# Patient Record
Sex: FEMALE | Race: WHITE
[De-identification: names, ages, dates, MRNs, and addresses within clinical notes are randomized per-mention and may not be internally consistent; named-entity substitution may affect disease eponyms.]

---

## 2017-04-01 ENCOUNTER — HOSPITAL ENCOUNTER (EMERGENCY)
Dept: HOSPITAL 1 - ED | Age: 77
LOS: 1 days | Discharge: HOME | End: 2017-04-02
Payer: COMMERCIAL

## 2017-04-01 DIAGNOSIS — E03.9: ICD-10-CM

## 2017-04-01 DIAGNOSIS — N18.3: ICD-10-CM

## 2017-04-01 DIAGNOSIS — E78.00: ICD-10-CM

## 2017-04-01 DIAGNOSIS — J44.9: ICD-10-CM

## 2017-04-01 DIAGNOSIS — I12.9: ICD-10-CM

## 2017-04-01 DIAGNOSIS — G25.5: ICD-10-CM

## 2017-04-01 DIAGNOSIS — N39.0: Primary | ICD-10-CM

## 2017-04-01 DIAGNOSIS — K57.32: ICD-10-CM

## 2017-04-01 LAB
ALBUMIN SERPL-MCNC: 3.6 G/DL (ref 3.4–5)
ALP SERPL-CCNC: 54 U/L (ref 46–116)
ALT SERPL-CCNC: 15 U/L (ref 14–59)
AMYLASE SERPL-CCNC: 36 U/L (ref 25–115)
AST SERPL-CCNC: 10 U/L (ref 15–37)
BASOPHILS NFR BLD: 0.5 % (ref 0–2)
BILIRUB SERPL-MCNC: 0.3 MG/DL (ref 0.2–1)
BUN SERPL-MCNC: 29 MG/DL (ref 7–18)
CALCIUM SERPL-MCNC: 9.4 MG/DL (ref 8.5–10.1)
CHLORIDE SERPL-SCNC: 103 MMOL/L (ref 98–107)
CO2 SERPL-SCNC: 29.1 MMOL/L (ref 21–32)
CREAT SERPL-MCNC: 1.2 MG/DL (ref 0.6–1)
ERYTHROCYTE [DISTWIDTH] IN BLOOD BY AUTOMATED COUNT: 16.6 % (ref 11.5–14.5)
GFR SERPLBLD BASED ON 1.73 SQ M-ARVRAT: (no result) ML/MIN
GLUCOSE SERPL-MCNC: 106 MG/DL (ref 74–106)
LIPASE SERPL-CCNC: 122 IU/L (ref 73–393)
MICROSCOPIC UR-IMP: YES
PLATELET # BLD: 297 X10^3MCL (ref 130–400)
POTASSIUM SERPL-SCNC: 4.8 MMOL/L (ref 3.5–5.1)
PROT SERPL-MCNC: 7.2 G/DL (ref 6.4–8.2)
RBC # UR STRIP.AUTO: (no result) /UL
SODIUM SERPL-SCNC: 139 MMOL/L (ref 136–145)
UA SPECIFIC GRAVITY: 1.02 (ref 1–1.03)

## 2017-04-02 VITALS — SYSTOLIC BLOOD PRESSURE: 137 MMHG | DIASTOLIC BLOOD PRESSURE: 80 MMHG

## 2017-04-06 ENCOUNTER — HOSPITAL ENCOUNTER (INPATIENT)
Dept: HOSPITAL 1 - ED | Age: 77
LOS: 5 days | Discharge: HOME HEALTH | DRG: 689 | End: 2017-04-11
Attending: FAMILY MEDICINE | Admitting: FAMILY MEDICINE
Payer: COMMERCIAL

## 2017-04-06 VITALS
BODY MASS INDEX: 28.88 KG/M2 | BODY MASS INDEX: 28.88 KG/M2 | HEIGHT: 69 IN | WEIGHT: 195 LBS | HEIGHT: 69 IN | WEIGHT: 195 LBS

## 2017-04-06 DIAGNOSIS — K21.9: ICD-10-CM

## 2017-04-06 DIAGNOSIS — F03.90: ICD-10-CM

## 2017-04-06 DIAGNOSIS — Z79.84: ICD-10-CM

## 2017-04-06 DIAGNOSIS — I12.9: ICD-10-CM

## 2017-04-06 DIAGNOSIS — G47.33: ICD-10-CM

## 2017-04-06 DIAGNOSIS — E83.51: ICD-10-CM

## 2017-04-06 DIAGNOSIS — B96.20: ICD-10-CM

## 2017-04-06 DIAGNOSIS — Z85.3: ICD-10-CM

## 2017-04-06 DIAGNOSIS — E43: ICD-10-CM

## 2017-04-06 DIAGNOSIS — R80.8: ICD-10-CM

## 2017-04-06 DIAGNOSIS — N18.3: ICD-10-CM

## 2017-04-06 DIAGNOSIS — Z92.21: ICD-10-CM

## 2017-04-06 DIAGNOSIS — E83.42: ICD-10-CM

## 2017-04-06 DIAGNOSIS — E11.65: ICD-10-CM

## 2017-04-06 DIAGNOSIS — D68.69: ICD-10-CM

## 2017-04-06 DIAGNOSIS — F41.9: ICD-10-CM

## 2017-04-06 DIAGNOSIS — K52.9: ICD-10-CM

## 2017-04-06 DIAGNOSIS — J44.9: ICD-10-CM

## 2017-04-06 DIAGNOSIS — G25.2: ICD-10-CM

## 2017-04-06 DIAGNOSIS — N17.0: ICD-10-CM

## 2017-04-06 DIAGNOSIS — F32.9: ICD-10-CM

## 2017-04-06 DIAGNOSIS — R31.9: ICD-10-CM

## 2017-04-06 DIAGNOSIS — N39.0: Primary | ICD-10-CM

## 2017-04-06 DIAGNOSIS — E78.5: ICD-10-CM

## 2017-04-06 DIAGNOSIS — Z92.3: ICD-10-CM

## 2017-04-06 LAB
ALBUMIN SERPL-MCNC: 3.4 G/DL (ref 3.4–5)
ALP SERPL-CCNC: 51 U/L (ref 46–116)
ALT SERPL-CCNC: 14 U/L (ref 14–59)
AMYLASE SERPL-CCNC: 26 U/L (ref 25–115)
AST SERPL-CCNC: 25 U/L (ref 15–37)
BASOPHILS NFR BLD: 0.6 % (ref 0–2)
BILIRUB SERPL-MCNC: 0.43 MG/DL (ref 0.2–1)
BUN SERPL-MCNC: 18 MG/DL (ref 7–18)
CALCIUM SERPL-MCNC: 8.9 MG/DL (ref 8.5–10.1)
CHLORIDE SERPL-SCNC: 102 MMOL/L (ref 98–107)
CHOLEST SERPL-MCNC: 152 MG/DL (ref ?–200)
CHOLEST/HDLC SERPL: 2.3 MG/DL
CO2 SERPL-SCNC: 30.1 MMOL/L (ref 21–32)
CREAT SERPL-MCNC: 1.2 MG/DL (ref 0.6–1)
ERYTHROCYTE [DISTWIDTH] IN BLOOD BY AUTOMATED COUNT: 16.2 % (ref 11.5–14.5)
GFR SERPLBLD BASED ON 1.73 SQ M-ARVRAT: (no result) ML/MIN
GLUCOSE SERPL-MCNC: 95 MG/DL (ref 74–106)
HDLC SERPL-MCNC: 66 MG/DL (ref 40–60)
LIPASE SERPL-CCNC: 73 IU/L (ref 73–393)
MICROSCOPIC UR-IMP: YES
PLATELET # BLD: 307 X10^3MCL (ref 130–400)
POTASSIUM SERPL-SCNC: 4.5 MMOL/L (ref 3.5–5.1)
PROT SERPL-MCNC: 7.3 G/DL (ref 6.4–8.2)
RBC # UR STRIP.AUTO: (no result) /UL
SODIUM SERPL-SCNC: 139 MMOL/L (ref 136–145)
T3 SERPL-MCNC: 0.79 NG/ML
T3RU NFR SERPL: 32 % UPTAKE (ref 30–39)
T4 FREE SERPL-MCNC: 1.1 NG/DL (ref 0.76–1.46)
T4 SERPL-MCNC: 9.3 UG/DL (ref 4.7–13.3)
T4/T3 UPTAKE INDEX SERPL: 3 UG/DL (ref 1.4–4.5)
TRIGL SERPL-MCNC: 125 MG/DL (ref ?–150)
UA SPECIFIC GRAVITY: 1.02 (ref 1–1.03)

## 2017-04-07 VITALS — SYSTOLIC BLOOD PRESSURE: 145 MMHG | DIASTOLIC BLOOD PRESSURE: 73 MMHG

## 2017-04-07 VITALS — SYSTOLIC BLOOD PRESSURE: 125 MMHG | DIASTOLIC BLOOD PRESSURE: 64 MMHG

## 2017-04-07 VITALS — DIASTOLIC BLOOD PRESSURE: 73 MMHG | SYSTOLIC BLOOD PRESSURE: 145 MMHG

## 2017-04-07 VITALS — SYSTOLIC BLOOD PRESSURE: 120 MMHG | DIASTOLIC BLOOD PRESSURE: 54 MMHG

## 2017-04-07 VITALS — DIASTOLIC BLOOD PRESSURE: 52 MMHG | SYSTOLIC BLOOD PRESSURE: 121 MMHG

## 2017-04-07 LAB
AMPHETAMINES UR QL SCN: (no result)
BASOPHILS NFR BLD: 0.9 % (ref 0–2)
BILIRUB DIRECT SERPL-MCNC: 0.06 MG/DL (ref 0–0.2)
BUN SERPL-MCNC: 14 MG/DL (ref 7–18)
CALCIUM SERPL-MCNC: 7.8 MG/DL (ref 8.5–10.1)
CHLORIDE SERPL-SCNC: 108 MMOL/L (ref 98–107)
CO2 SERPL-SCNC: 28.1 MMOL/L (ref 21–32)
CREAT SERPL-MCNC: 1.1 MG/DL (ref 0.6–1)
ERYTHROCYTE [DISTWIDTH] IN BLOOD BY AUTOMATED COUNT: 16.3 % (ref 11.5–14.5)
GFR SERPLBLD BASED ON 1.73 SQ M-ARVRAT: (no result) ML/MIN
GLUCOSE SERPL-MCNC: 103 MG/DL (ref 74–106)
MAGNESIUM SERPL-MCNC: 1.8 MG/DL (ref 1.8–2.4)
PHOSPHATE SERPL-MCNC: 2.8 MG/DL (ref 2.5–4.9)
PLATELET # BLD: 237 X10^3MCL (ref 130–400)
POTASSIUM SERPL-SCNC: 4.4 MMOL/L (ref 3.5–5.1)
SODIUM SERPL-SCNC: 141 MMOL/L (ref 136–145)
URATE SERPL-MCNC: 5.6 MG/DL (ref 2.6–6)

## 2017-04-08 VITALS — SYSTOLIC BLOOD PRESSURE: 127 MMHG | DIASTOLIC BLOOD PRESSURE: 51 MMHG

## 2017-04-08 VITALS — SYSTOLIC BLOOD PRESSURE: 126 MMHG | DIASTOLIC BLOOD PRESSURE: 55 MMHG

## 2017-04-08 VITALS — DIASTOLIC BLOOD PRESSURE: 76 MMHG | SYSTOLIC BLOOD PRESSURE: 115 MMHG

## 2017-04-08 VITALS — SYSTOLIC BLOOD PRESSURE: 139 MMHG | DIASTOLIC BLOOD PRESSURE: 67 MMHG

## 2017-04-08 VITALS — DIASTOLIC BLOOD PRESSURE: 73 MMHG | SYSTOLIC BLOOD PRESSURE: 127 MMHG

## 2017-04-08 LAB
ALBUMIN SERPL-MCNC: 2.7 G/DL (ref 3.4–5)
BASOPHILS NFR BLD: 0.4 % (ref 0–2)
BUN SERPL-MCNC: 8 MG/DL (ref 7–18)
CALCIUM SERPL-MCNC: 8.3 MG/DL (ref 8.5–10.1)
CHLORIDE SERPL-SCNC: 106 MMOL/L (ref 98–107)
CO2 SERPL-SCNC: 28.4 MMOL/L (ref 21–32)
CREAT SERPL-MCNC: 1 MG/DL (ref 0.6–1)
ERYTHROCYTE [DISTWIDTH] IN BLOOD BY AUTOMATED COUNT: 15.7 % (ref 11.5–14.5)
GFR SERPLBLD BASED ON 1.73 SQ M-ARVRAT: (no result) ML/MIN
GLUCOSE SERPL-MCNC: 97 MG/DL (ref 74–106)
PLATELET # BLD: 245 X10^3MCL (ref 130–400)
POTASSIUM SERPL-SCNC: 4.1 MMOL/L (ref 3.5–5.1)
SODIUM SERPL-SCNC: 143 MMOL/L (ref 136–145)

## 2017-04-09 VITALS — SYSTOLIC BLOOD PRESSURE: 115 MMHG | DIASTOLIC BLOOD PRESSURE: 51 MMHG

## 2017-04-09 VITALS — SYSTOLIC BLOOD PRESSURE: 116 MMHG | DIASTOLIC BLOOD PRESSURE: 57 MMHG

## 2017-04-09 VITALS — SYSTOLIC BLOOD PRESSURE: 112 MMHG | DIASTOLIC BLOOD PRESSURE: 55 MMHG

## 2017-04-09 VITALS — SYSTOLIC BLOOD PRESSURE: 137 MMHG | DIASTOLIC BLOOD PRESSURE: 63 MMHG

## 2017-04-09 LAB
BASOPHILS NFR BLD: 0.2 % (ref 0–2)
BUN SERPL-MCNC: 6 MG/DL (ref 7–18)
CALCIUM SERPL-MCNC: 8.5 MG/DL (ref 8.5–10.1)
CHLORIDE SERPL-SCNC: 108 MMOL/L (ref 98–107)
CO2 SERPL-SCNC: 28.8 MMOL/L (ref 21–32)
CREAT SERPL-MCNC: 0.9 MG/DL (ref 0.6–1)
ERYTHROCYTE [DISTWIDTH] IN BLOOD BY AUTOMATED COUNT: 15.9 % (ref 11.5–14.5)
GFR SERPLBLD BASED ON 1.73 SQ M-ARVRAT: (no result) ML/MIN
GLUCOSE SERPL-MCNC: 92 MG/DL (ref 74–106)
MAGNESIUM SERPL-MCNC: 1.6 MG/DL (ref 1.8–2.4)
PHOSPHATE SERPL-MCNC: 2.9 MG/DL (ref 2.5–4.9)
PLATELET # BLD: 241 X10^3MCL (ref 130–400)
POTASSIUM SERPL-SCNC: 4 MMOL/L (ref 3.5–5.1)
SODIUM SERPL-SCNC: 143 MMOL/L (ref 136–145)

## 2017-04-10 VITALS — DIASTOLIC BLOOD PRESSURE: 65 MMHG | SYSTOLIC BLOOD PRESSURE: 126 MMHG

## 2017-04-10 VITALS — DIASTOLIC BLOOD PRESSURE: 75 MMHG | SYSTOLIC BLOOD PRESSURE: 129 MMHG

## 2017-04-10 VITALS — SYSTOLIC BLOOD PRESSURE: 137 MMHG | DIASTOLIC BLOOD PRESSURE: 60 MMHG

## 2017-04-10 VITALS — DIASTOLIC BLOOD PRESSURE: 74 MMHG | SYSTOLIC BLOOD PRESSURE: 127 MMHG

## 2017-04-10 LAB
BASOPHILS NFR BLD: 0.5 % (ref 0–2)
ERYTHROCYTE [DISTWIDTH] IN BLOOD BY AUTOMATED COUNT: 15.8 % (ref 11.5–14.5)
PLATELET # BLD: 225 X10^3MCL (ref 130–400)

## 2017-04-11 VITALS — SYSTOLIC BLOOD PRESSURE: 109 MMHG | DIASTOLIC BLOOD PRESSURE: 40 MMHG

## 2017-04-11 VITALS — DIASTOLIC BLOOD PRESSURE: 71 MMHG | SYSTOLIC BLOOD PRESSURE: 141 MMHG

## 2017-04-11 VITALS — DIASTOLIC BLOOD PRESSURE: 62 MMHG | SYSTOLIC BLOOD PRESSURE: 151 MMHG

## 2017-04-11 VITALS — SYSTOLIC BLOOD PRESSURE: 141 MMHG | DIASTOLIC BLOOD PRESSURE: 71 MMHG

## 2017-04-11 LAB
BASOPHILS NFR BLD: 0.4 % (ref 0–2)
BUN SERPL-MCNC: 13 MG/DL (ref 7–18)
CALCIUM SERPL-MCNC: 8.3 MG/DL (ref 8.5–10.1)
CHLORIDE SERPL-SCNC: 107 MMOL/L (ref 98–107)
CO2 SERPL-SCNC: 32.4 MMOL/L (ref 21–32)
CREAT SERPL-MCNC: 1 MG/DL (ref 0.6–1)
ERYTHROCYTE [DISTWIDTH] IN BLOOD BY AUTOMATED COUNT: 16 % (ref 11.5–14.5)
GFR SERPLBLD BASED ON 1.73 SQ M-ARVRAT: (no result) ML/MIN
GLUCOSE SERPL-MCNC: 99 MG/DL (ref 74–106)
MAGNESIUM SERPL-MCNC: 2.1 MG/DL (ref 1.8–2.4)
PLATELET # BLD: 241 X10^3MCL (ref 130–400)
POTASSIUM SERPL-SCNC: 4 MMOL/L (ref 3.5–5.1)
SODIUM SERPL-SCNC: 142 MMOL/L (ref 136–145)

## 2017-04-14 ENCOUNTER — HOSPITAL ENCOUNTER (EMERGENCY)
Dept: HOSPITAL 1 - ED | Age: 77
Discharge: HOME | End: 2017-04-14
Payer: COMMERCIAL

## 2017-04-14 VITALS — DIASTOLIC BLOOD PRESSURE: 74 MMHG | SYSTOLIC BLOOD PRESSURE: 134 MMHG

## 2017-04-14 DIAGNOSIS — E03.9: ICD-10-CM

## 2017-04-14 DIAGNOSIS — Z85.3: ICD-10-CM

## 2017-04-14 DIAGNOSIS — E78.5: ICD-10-CM

## 2017-04-14 DIAGNOSIS — Z88.5: ICD-10-CM

## 2017-04-14 DIAGNOSIS — I10: ICD-10-CM

## 2017-04-14 DIAGNOSIS — R51: Primary | ICD-10-CM

## 2017-04-14 DIAGNOSIS — J44.9: ICD-10-CM

## 2017-04-14 DIAGNOSIS — Z79.899: ICD-10-CM

## 2017-04-14 DIAGNOSIS — M85.80: ICD-10-CM

## 2017-04-14 LAB
ALBUMIN SERPL-MCNC: 2.9 G/DL (ref 3.4–5)
ALP SERPL-CCNC: 41 U/L (ref 46–116)
ALT SERPL-CCNC: 17 U/L (ref 14–59)
AST SERPL-CCNC: 16 U/L (ref 15–37)
BASOPHILS NFR BLD: 0.2 % (ref 0–2)
BILIRUB SERPL-MCNC: 0.19 MG/DL (ref 0.2–1)
BUN SERPL-MCNC: 18 MG/DL (ref 7–18)
CALCIUM SERPL-MCNC: 8.6 MG/DL (ref 8.5–10.1)
CHLORIDE SERPL-SCNC: 104 MMOL/L (ref 98–107)
CO2 SERPL-SCNC: 30.6 MMOL/L (ref 21–32)
CREAT SERPL-MCNC: 1.1 MG/DL (ref 0.6–1)
ERYTHROCYTE [DISTWIDTH] IN BLOOD BY AUTOMATED COUNT: 16.2 % (ref 11.5–14.5)
GFR SERPLBLD BASED ON 1.73 SQ M-ARVRAT: (no result) ML/MIN
GLUCOSE SERPL-MCNC: 90 MG/DL (ref 74–106)
MICROSCOPIC UR-IMP: NO
PLATELET # BLD: 289 X10^3MCL (ref 130–400)
POTASSIUM SERPL-SCNC: 4.1 MMOL/L (ref 3.5–5.1)
PROT SERPL-MCNC: 6.5 G/DL (ref 6.4–8.2)
RBC # UR STRIP.AUTO: NEGATIVE /UL
SODIUM SERPL-SCNC: 140 MMOL/L (ref 136–145)
UA SPECIFIC GRAVITY: 1.02 (ref 1–1.03)

## 2017-06-23 ENCOUNTER — HOSPITAL ENCOUNTER (INPATIENT)
Dept: HOSPITAL 1 - ED | Age: 77
LOS: 5 days | Discharge: NURSING, REHAB OR LONG-TERM CARE | DRG: 177 | End: 2017-06-28
Attending: FAMILY MEDICINE | Admitting: FAMILY MEDICINE
Payer: COMMERCIAL

## 2017-06-23 VITALS
BODY MASS INDEX: 32.33 KG/M2 | HEIGHT: 69 IN | HEIGHT: 69 IN | BODY MASS INDEX: 32.33 KG/M2 | WEIGHT: 218.26 LBS | WEIGHT: 218.26 LBS

## 2017-06-23 VITALS — DIASTOLIC BLOOD PRESSURE: 70 MMHG | SYSTOLIC BLOOD PRESSURE: 138 MMHG

## 2017-06-23 VITALS — SYSTOLIC BLOOD PRESSURE: 146 MMHG | DIASTOLIC BLOOD PRESSURE: 63 MMHG

## 2017-06-23 VITALS — SYSTOLIC BLOOD PRESSURE: 141 MMHG | DIASTOLIC BLOOD PRESSURE: 76 MMHG

## 2017-06-23 DIAGNOSIS — Z92.21: ICD-10-CM

## 2017-06-23 DIAGNOSIS — G47.30: ICD-10-CM

## 2017-06-23 DIAGNOSIS — J69.0: Primary | ICD-10-CM

## 2017-06-23 DIAGNOSIS — I16.0: ICD-10-CM

## 2017-06-23 DIAGNOSIS — E03.9: ICD-10-CM

## 2017-06-23 DIAGNOSIS — Z92.3: ICD-10-CM

## 2017-06-23 DIAGNOSIS — R25.1: ICD-10-CM

## 2017-06-23 DIAGNOSIS — N17.0: ICD-10-CM

## 2017-06-23 DIAGNOSIS — D64.9: ICD-10-CM

## 2017-06-23 DIAGNOSIS — Z85.3: ICD-10-CM

## 2017-06-23 DIAGNOSIS — Z90.13: ICD-10-CM

## 2017-06-23 DIAGNOSIS — J44.9: ICD-10-CM

## 2017-06-23 DIAGNOSIS — E11.65: ICD-10-CM

## 2017-06-23 DIAGNOSIS — E86.0: ICD-10-CM

## 2017-06-23 DIAGNOSIS — F32.9: ICD-10-CM

## 2017-06-23 DIAGNOSIS — F41.9: ICD-10-CM

## 2017-06-23 LAB
ALBUMIN SERPL-MCNC: 3.4 G/DL (ref 3.4–5)
ALP SERPL-CCNC: 62 U/L (ref 46–116)
ALT SERPL-CCNC: 14 U/L (ref 14–59)
AMYLASE SERPL-CCNC: 40 U/L (ref 25–115)
AST SERPL-CCNC: 13 U/L (ref 15–37)
BASOPHILS NFR BLD: 0.6 % (ref 0–2)
BILIRUB SERPL-MCNC: 0.2 MG/DL (ref 0.2–1)
BUN SERPL-MCNC: 24 MG/DL (ref 7–18)
CALCIUM SERPL-MCNC: 9.1 MG/DL (ref 8.5–10.1)
CHLORIDE SERPL-SCNC: 102 MMOL/L (ref 98–107)
CHOLEST SERPL-MCNC: 158 MG/DL (ref ?–200)
CHOLEST SERPL-MCNC: 162 MG/DL (ref ?–200)
CHOLEST/HDLC SERPL: 2.2 MG/DL
CO2 SERPL-SCNC: 28 MMOL/L (ref 21–32)
CREAT SERPL-MCNC: 1.2 MG/DL (ref 0.6–1)
ERYTHROCYTE [DISTWIDTH] IN BLOOD BY AUTOMATED COUNT: 16.7 % (ref 11.5–14.5)
GFR SERPLBLD BASED ON 1.73 SQ M-ARVRAT: (no result) ML/MIN
GLUCOSE SERPL-MCNC: 114 MG/DL (ref 74–106)
HDLC SERPL-MCNC: 73 MG/DL (ref 40–60)
HDLC SERPL-MCNC: 76 MG/DL (ref 40–60)
LDH SERPL-CCNC: 158 U/L (ref 100–190)
LIPASE SERPL-CCNC: 142 IU/L (ref 73–393)
MAGNESIUM SERPL-MCNC: 2.1 MG/DL (ref 1.8–2.4)
PHOSPHATE SERPL-MCNC: 3.4 MG/DL (ref 2.5–4.9)
PLATELET # BLD: 307 X10^3MCL (ref 130–400)
POTASSIUM SERPL-SCNC: 4.3 MMOL/L (ref 3.5–5.1)
PROT SERPL-MCNC: 7.5 G/DL (ref 6.4–8.2)
SODIUM SERPL-SCNC: 139 MMOL/L (ref 136–145)
T3 SERPL-MCNC: 0.94 NG/ML
T3RU NFR SERPL: 33 % UPTAKE (ref 30–39)
T4 FREE SERPL-MCNC: 1.05 NG/DL (ref 0.76–1.46)
T4 SERPL-MCNC: 8.3 UG/DL (ref 4.7–13.3)
T4/T3 UPTAKE INDEX SERPL: 2.7 UG/DL (ref 1.4–4.5)
TRIGL SERPL-MCNC: 138 MG/DL (ref ?–150)

## 2017-06-24 VITALS — SYSTOLIC BLOOD PRESSURE: 143 MMHG | DIASTOLIC BLOOD PRESSURE: 79 MMHG

## 2017-06-24 VITALS — SYSTOLIC BLOOD PRESSURE: 127 MMHG | DIASTOLIC BLOOD PRESSURE: 61 MMHG

## 2017-06-24 VITALS — SYSTOLIC BLOOD PRESSURE: 132 MMHG | DIASTOLIC BLOOD PRESSURE: 79 MMHG

## 2017-06-24 VITALS — DIASTOLIC BLOOD PRESSURE: 46 MMHG | SYSTOLIC BLOOD PRESSURE: 106 MMHG

## 2017-06-24 VITALS — DIASTOLIC BLOOD PRESSURE: 56 MMHG | SYSTOLIC BLOOD PRESSURE: 107 MMHG

## 2017-06-24 LAB
BASOPHILS NFR BLD: 0 % (ref 0–2)
BUN SERPL-MCNC: 22 MG/DL (ref 7–18)
CALCIUM SERPL-MCNC: 8.6 MG/DL (ref 8.5–10.1)
CHLORIDE SERPL-SCNC: 105 MMOL/L (ref 98–107)
CO2 SERPL-SCNC: 25.6 MMOL/L (ref 21–32)
CREAT SERPL-MCNC: 1 MG/DL (ref 0.6–1)
ERYTHROCYTE [DISTWIDTH] IN BLOOD BY AUTOMATED COUNT: 16.6 % (ref 11.5–14.5)
GFR SERPLBLD BASED ON 1.73 SQ M-ARVRAT: (no result) ML/MIN
GLUCOSE SERPL-MCNC: 144 MG/DL (ref 74–106)
MAGNESIUM SERPL-MCNC: 2 MG/DL (ref 1.8–2.4)
MICROSCOPIC UR-IMP: NO
PHOSPHATE SERPL-MCNC: 3.6 MG/DL (ref 2.5–4.9)
PLATELET # BLD: 284 X10^3MCL (ref 130–400)
POTASSIUM SERPL-SCNC: 5 MMOL/L (ref 3.5–5.1)
RBC # UR STRIP.AUTO: NEGATIVE /UL
SODIUM SERPL-SCNC: 141 MMOL/L (ref 136–145)
UA SPECIFIC GRAVITY: 1.02 (ref 1–1.03)

## 2017-06-25 VITALS — SYSTOLIC BLOOD PRESSURE: 132 MMHG | DIASTOLIC BLOOD PRESSURE: 69 MMHG

## 2017-06-25 VITALS — SYSTOLIC BLOOD PRESSURE: 148 MMHG | DIASTOLIC BLOOD PRESSURE: 77 MMHG

## 2017-06-25 VITALS — SYSTOLIC BLOOD PRESSURE: 137 MMHG | DIASTOLIC BLOOD PRESSURE: 71 MMHG

## 2017-06-25 VITALS — SYSTOLIC BLOOD PRESSURE: 129 MMHG | DIASTOLIC BLOOD PRESSURE: 72 MMHG

## 2017-06-25 VITALS — DIASTOLIC BLOOD PRESSURE: 61 MMHG | SYSTOLIC BLOOD PRESSURE: 122 MMHG

## 2017-06-25 LAB
BASOPHILS NFR BLD: 0 % (ref 0–2)
BUN SERPL-MCNC: 27 MG/DL (ref 7–18)
CALCIUM SERPL-MCNC: 8.6 MG/DL (ref 8.5–10.1)
CHLORIDE SERPL-SCNC: 106 MMOL/L (ref 98–107)
CO2 SERPL-SCNC: 24.9 MMOL/L (ref 21–32)
CREAT SERPL-MCNC: 1 MG/DL (ref 0.6–1)
ERYTHROCYTE [DISTWIDTH] IN BLOOD BY AUTOMATED COUNT: 16.4 % (ref 11.5–14.5)
GFR SERPLBLD BASED ON 1.73 SQ M-ARVRAT: (no result) ML/MIN
GLUCOSE SERPL-MCNC: 128 MG/DL (ref 74–106)
MAGNESIUM SERPL-MCNC: 2.2 MG/DL (ref 1.8–2.4)
PHOSPHATE SERPL-MCNC: 4.1 MG/DL (ref 2.5–4.9)
PLATELET # BLD: 277 X10^3MCL (ref 130–400)
POTASSIUM SERPL-SCNC: 4.7 MMOL/L (ref 3.5–5.1)
SODIUM SERPL-SCNC: 140 MMOL/L (ref 136–145)

## 2017-06-26 VITALS — SYSTOLIC BLOOD PRESSURE: 144 MMHG | DIASTOLIC BLOOD PRESSURE: 72 MMHG

## 2017-06-26 VITALS — DIASTOLIC BLOOD PRESSURE: 62 MMHG | SYSTOLIC BLOOD PRESSURE: 124 MMHG

## 2017-06-26 VITALS — DIASTOLIC BLOOD PRESSURE: 67 MMHG | SYSTOLIC BLOOD PRESSURE: 133 MMHG

## 2017-06-26 VITALS — SYSTOLIC BLOOD PRESSURE: 113 MMHG | DIASTOLIC BLOOD PRESSURE: 56 MMHG

## 2017-06-26 VITALS — SYSTOLIC BLOOD PRESSURE: 145 MMHG | DIASTOLIC BLOOD PRESSURE: 78 MMHG

## 2017-06-26 VITALS — SYSTOLIC BLOOD PRESSURE: 130 MMHG | DIASTOLIC BLOOD PRESSURE: 56 MMHG

## 2017-06-26 LAB
BASOPHILS NFR BLD: 0.1 % (ref 0–2)
BUN SERPL-MCNC: 26 MG/DL (ref 7–18)
CALCIUM SERPL-MCNC: 8.8 MG/DL (ref 8.5–10.1)
CHLORIDE SERPL-SCNC: 103 MMOL/L (ref 98–107)
CO2 SERPL-SCNC: 28.3 MMOL/L (ref 21–32)
CREAT SERPL-MCNC: 1.1 MG/DL (ref 0.6–1)
ERYTHROCYTE [DISTWIDTH] IN BLOOD BY AUTOMATED COUNT: 16.3 % (ref 11.5–14.5)
GFR SERPLBLD BASED ON 1.73 SQ M-ARVRAT: (no result) ML/MIN
GLUCOSE SERPL-MCNC: 103 MG/DL (ref 74–106)
MAGNESIUM SERPL-MCNC: 2.2 MG/DL (ref 1.8–2.4)
PHOSPHATE SERPL-MCNC: 4.1 MG/DL (ref 2.5–4.9)
PLATELET # BLD: 278 X10^3MCL (ref 130–400)
POTASSIUM SERPL-SCNC: 4.4 MMOL/L (ref 3.5–5.1)
SODIUM SERPL-SCNC: 140 MMOL/L (ref 136–145)

## 2017-06-27 VITALS — DIASTOLIC BLOOD PRESSURE: 77 MMHG | SYSTOLIC BLOOD PRESSURE: 144 MMHG

## 2017-06-27 VITALS — DIASTOLIC BLOOD PRESSURE: 50 MMHG | SYSTOLIC BLOOD PRESSURE: 108 MMHG

## 2017-06-27 VITALS — DIASTOLIC BLOOD PRESSURE: 51 MMHG | SYSTOLIC BLOOD PRESSURE: 107 MMHG

## 2017-06-27 VITALS — DIASTOLIC BLOOD PRESSURE: 78 MMHG | SYSTOLIC BLOOD PRESSURE: 152 MMHG

## 2017-06-27 VITALS — SYSTOLIC BLOOD PRESSURE: 141 MMHG | DIASTOLIC BLOOD PRESSURE: 75 MMHG

## 2017-06-27 LAB
BASOPHILS NFR BLD: 0 % (ref 0–2)
BUN SERPL-MCNC: 25 MG/DL (ref 7–18)
CALCIUM SERPL-MCNC: 9 MG/DL (ref 8.5–10.1)
CHLORIDE SERPL-SCNC: 102 MMOL/L (ref 98–107)
CO2 SERPL-SCNC: 31.2 MMOL/L (ref 21–32)
CREAT SERPL-MCNC: 1 MG/DL (ref 0.6–1)
ERYTHROCYTE [DISTWIDTH] IN BLOOD BY AUTOMATED COUNT: 17 % (ref 11.5–14.5)
GFR SERPLBLD BASED ON 1.73 SQ M-ARVRAT: (no result) ML/MIN
GLUCOSE SERPL-MCNC: 128 MG/DL (ref 74–106)
MAGNESIUM SERPL-MCNC: 2.1 MG/DL (ref 1.8–2.4)
PHOSPHATE SERPL-MCNC: 4.1 MG/DL (ref 2.5–4.9)
PLATELET # BLD: 276 X10^3MCL (ref 130–400)
POTASSIUM SERPL-SCNC: 4.7 MMOL/L (ref 3.5–5.1)
SODIUM SERPL-SCNC: 138 MMOL/L (ref 136–145)

## 2017-06-28 VITALS — DIASTOLIC BLOOD PRESSURE: 74 MMHG | SYSTOLIC BLOOD PRESSURE: 148 MMHG

## 2017-06-28 VITALS — SYSTOLIC BLOOD PRESSURE: 159 MMHG | DIASTOLIC BLOOD PRESSURE: 81 MMHG

## 2017-06-28 VITALS — DIASTOLIC BLOOD PRESSURE: 81 MMHG | SYSTOLIC BLOOD PRESSURE: 159 MMHG

## 2017-06-28 VITALS — SYSTOLIC BLOOD PRESSURE: 152 MMHG | DIASTOLIC BLOOD PRESSURE: 78 MMHG

## 2017-06-28 LAB
BASOPHILS NFR BLD: 0 % (ref 0–2)
BUN SERPL-MCNC: 26 MG/DL (ref 7–18)
CALCIUM SERPL-MCNC: 8.9 MG/DL (ref 8.5–10.1)
CHLORIDE SERPL-SCNC: 101 MMOL/L (ref 98–107)
CO2 SERPL-SCNC: 29.1 MMOL/L (ref 21–32)
CREAT SERPL-MCNC: 1.1 MG/DL (ref 0.6–1)
ERYTHROCYTE [DISTWIDTH] IN BLOOD BY AUTOMATED COUNT: 16.7 % (ref 11.5–14.5)
GFR SERPLBLD BASED ON 1.73 SQ M-ARVRAT: (no result) ML/MIN
GLUCOSE SERPL-MCNC: 130 MG/DL (ref 74–106)
MAGNESIUM SERPL-MCNC: 2 MG/DL (ref 1.8–2.4)
PHOSPHATE SERPL-MCNC: 4 MG/DL (ref 2.5–4.9)
PLATELET # BLD: 291 X10^3MCL (ref 130–400)
POTASSIUM SERPL-SCNC: 4.5 MMOL/L (ref 3.5–5.1)
SODIUM SERPL-SCNC: 139 MMOL/L (ref 136–145)

## 2017-08-11 ENCOUNTER — HOSPITAL ENCOUNTER (EMERGENCY)
Dept: HOSPITAL 1 - ED | Age: 77
Discharge: HOME | End: 2017-08-11
Payer: COMMERCIAL

## 2017-08-11 VITALS — DIASTOLIC BLOOD PRESSURE: 78 MMHG | SYSTOLIC BLOOD PRESSURE: 148 MMHG

## 2017-08-11 DIAGNOSIS — E03.9: ICD-10-CM
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
## 2017-08-11 DIAGNOSIS — E78.00: ICD-10-CM

## 2017-08-11 DIAGNOSIS — I10: ICD-10-CM

## 2017-08-11 DIAGNOSIS — Z88.6: ICD-10-CM

## 2017-08-11 DIAGNOSIS — Z88.5: ICD-10-CM
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
## 2017-08-11 DIAGNOSIS — S00.03XA: Primary | ICD-10-CM
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
## 2017-08-11 DIAGNOSIS — G47.30: ICD-10-CM
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
## 2017-08-11 DIAGNOSIS — J44.9: ICD-10-CM

## 2017-11-04 ENCOUNTER — HOSPITAL ENCOUNTER (INPATIENT)
Dept: HOSPITAL 1 - ED | Age: 77
LOS: 5 days | Discharge: NURSING, REHAB OR LONG-TERM CARE | DRG: 871 | End: 2017-11-09
Attending: STUDENT IN AN ORGANIZED HEALTH CARE EDUCATION/TRAINING PROGRAM | Admitting: STUDENT IN AN ORGANIZED HEALTH CARE EDUCATION/TRAINING PROGRAM
Payer: COMMERCIAL

## 2017-11-04 VITALS
BODY MASS INDEX: 31.4 KG/M2 | BODY MASS INDEX: 31.4 KG/M2 | WEIGHT: 212 LBS | WEIGHT: 212 LBS | HEIGHT: 69 IN | HEIGHT: 69 IN

## 2017-11-04 VITALS — SYSTOLIC BLOOD PRESSURE: 124 MMHG | DIASTOLIC BLOOD PRESSURE: 58 MMHG

## 2017-11-04 VITALS — SYSTOLIC BLOOD PRESSURE: 159 MMHG | DIASTOLIC BLOOD PRESSURE: 80 MMHG

## 2017-11-04 VITALS — SYSTOLIC BLOOD PRESSURE: 136 MMHG | DIASTOLIC BLOOD PRESSURE: 70 MMHG

## 2017-11-04 VITALS — DIASTOLIC BLOOD PRESSURE: 80 MMHG | SYSTOLIC BLOOD PRESSURE: 159 MMHG

## 2017-11-04 DIAGNOSIS — D68.69: ICD-10-CM

## 2017-11-04 DIAGNOSIS — N39.0: ICD-10-CM

## 2017-11-04 DIAGNOSIS — J20.9: ICD-10-CM

## 2017-11-04 DIAGNOSIS — Z92.21: ICD-10-CM

## 2017-11-04 DIAGNOSIS — E66.9: ICD-10-CM

## 2017-11-04 DIAGNOSIS — E11.51: ICD-10-CM

## 2017-11-04 DIAGNOSIS — R65.20: ICD-10-CM

## 2017-11-04 DIAGNOSIS — Z92.3: ICD-10-CM

## 2017-11-04 DIAGNOSIS — A41.9: Primary | ICD-10-CM

## 2017-11-04 DIAGNOSIS — E44.0: ICD-10-CM

## 2017-11-04 DIAGNOSIS — I10: ICD-10-CM

## 2017-11-04 DIAGNOSIS — J69.0: ICD-10-CM

## 2017-11-04 DIAGNOSIS — D50.9: ICD-10-CM

## 2017-11-04 DIAGNOSIS — Z85.3: ICD-10-CM

## 2017-11-04 DIAGNOSIS — F41.8: ICD-10-CM

## 2017-11-04 DIAGNOSIS — N17.0: ICD-10-CM

## 2017-11-04 DIAGNOSIS — E11.65: ICD-10-CM

## 2017-11-04 DIAGNOSIS — G47.33: ICD-10-CM

## 2017-11-04 DIAGNOSIS — E03.9: ICD-10-CM

## 2017-11-04 LAB
ALBUMIN SERPL-MCNC: 3.2 G/DL (ref 3.4–5)
ALP SERPL-CCNC: 65 U/L (ref 46–116)
ALT SERPL-CCNC: 16 U/L (ref 14–59)
AMYLASE SERPL-CCNC: 72 U/L (ref 25–115)
AST SERPL-CCNC: 14 U/L (ref 15–37)
BASOPHILS NFR BLD: 0 % (ref 0–2)
BILIRUB SERPL-MCNC: 0.28 MG/DL (ref 0.2–1)
BUN SERPL-MCNC: 24 MG/DL (ref 7–18)
CALCIUM SERPL-MCNC: 9 MG/DL (ref 8.5–10.1)
CHLORIDE SERPL-SCNC: 103 MMOL/L (ref 98–107)
CHOLEST SERPL-MCNC: 169 MG/DL (ref ?–200)
CHOLEST/HDLC SERPL: 2.2 MG/DL
CO2 SERPL-SCNC: 29 MMOL/L (ref 21–32)
CREAT SERPL-MCNC: 1.2 MG/DL (ref 0.6–1)
ERYTHROCYTE [DISTWIDTH] IN BLOOD BY AUTOMATED COUNT: 18.4 % (ref 11.5–14.5)
GFR SERPLBLD BASED ON 1.73 SQ M-ARVRAT: (no result) ML/MIN
GLUCOSE SERPL-MCNC: 128 MG/DL (ref 74–106)
HDLC SERPL-MCNC: 78 MG/DL (ref 40–60)
IRON SERPL-MCNC: 13 UG/DL (ref 50–170)
LIPASE SERPL-CCNC: 488 IU/L (ref 73–393)
MAGNESIUM SERPL-MCNC: 1.9 MG/DL (ref 1.8–2.4)
MICROSCOPIC UR-IMP: YES
MONOCYTES NFR BLD: 4 % (ref 0–7)
NEUTS BAND NFR BLD: 1 % (ref 0–10)
NEUTS SEG NFR BLD MANUAL: 87 % (ref 37–75)
PHOSPHATE SERPL-MCNC: 3 MG/DL (ref 2.5–4.9)
PLAT MORPH BLD: (no result)
PLATELET # BLD: 392 X10^3MCL (ref 130–400)
POTASSIUM SERPL-SCNC: 4.4 MMOL/L (ref 3.5–5.1)
PROT SERPL-MCNC: 7.2 G/DL (ref 6.4–8.2)
RBC # BLD AUTO: 4.37 M/MM3 (ref 4.1–5.1)
RBC # UR STRIP.AUTO: (no result) /UL
RBC MORPH BLD: (no result)
SODIUM SERPL-SCNC: 141 MMOL/L (ref 136–145)
T3 SERPL-MCNC: 0.81 NG/ML
T3RU NFR SERPL: 36 % UPTAKE (ref 30–39)
T4 FREE SERPL-MCNC: 0.98 NG/DL (ref 0.76–1.46)
T4 SERPL-MCNC: 7.7 UG/DL (ref 4.7–13.3)
T4/T3 UPTAKE INDEX SERPL: 2.8 UG/DL (ref 1.4–4.5)
TIBC SERPL-MCNC: 320 UG/DL (ref 250–450)
TRIGL SERPL-MCNC: 95 MG/DL (ref ?–150)
UA SPECIFIC GRAVITY: 1.01 (ref 1–1.03)

## 2017-11-04 NOTE — NUR
PT C/O SOB, HX OF COPD, BIBA VIA [facility] FROM [facility], LUNGS CLEAR
BILATERALLY, ECG MONITORING, OXYGEN 2 L NC, NO RESP DISTRESS, PROVIDED
REASSURANCE, EKG DONE, SEEN BY ED MD, LABS DRAWN AND SENT TO LAB

## 2017-11-04 NOTE — NUR
PT REMAINS IN NO ACUTE DISTRESS, RESTING IN BED. NO C/O PAIN OR DISCOMFORT AT
THIS TIME. IVF INFUSING WELL AND SITE CLEAR. CALL LIGHT WITHIN REACH. WILL BE
ENDORSED TO INCOMING SHIFT.

## 2017-11-04 NOTE — NUR
RECEIVED PT FROM ED VIA [facility], CAME IN DUE TO SOB X1 DAY. AAOX4. C/O MILD
DIZZINESS. SOB NOTED ON MINIMAL EXERTION, EXPIRATORY WHEEZES NOTED. ON
2LPM/NC, O2 SAT=97%. DENIES CHEST PAIN/PRESSURE, HR , SINUS TACHYCARDIA
ON THE MONITOR. C/O NAUSEA. DENIES ABDOMINAL PAIN. W/ TREMORS NOTED ON RUE. W/
PURPLISH DISCOLORATION ON RUE AND SCATTERED REDNESS ON BUE AND BLE. C/O
NUMBNESS AND TINGLING SENSATION ON BLE. HOB ELEVATED AT 30 DEG. SIDE RAILS
UPX2. CALL LIGHT ON REACH. RECEIVED PT FROM ED W/ NS, ZITHROMAX AND ROCEPHIN
ONGOING. PRIMARY NURSE CECE AT BEDSIDE FOR CONTINUITY OF CARE.

## 2017-11-04 NOTE — NUR
PT RESTING IN BED, NO ACUTE RESP. DISTRESS. NO C/O PAIN OR DISCOMFORT AT THIS
TIME. IVF INFUSING WELL AND SITE CLEAR. CALL LIGHT WITHIN REACH. WILL CONTINUE
W/PLAN OF CARE.

## 2017-11-04 NOTE — NUR
PATIENT'S PLAN OF CARE WAS DISCUSSED AND REVIEWED WITH LVN: NIKOLAY FELIX
 
I HAVE REVIEWED THE DATA COLLECTION BY LVN (NAME): NIKOLAY FELIX
ENTERED ON (DATE/TIME): 11/04/18 1958
 
I CONCUR WITH THE DATA AND ANY EXCEPTIONS OR COMMENTS ARE LISTED BELOW:

## 2017-11-04 NOTE — NUR
RECEIVED PT IN BED EATING DINNER AND TALKING TO HER FAMILY , PT'S AAOX4 DENY
PAIN AT THE MOMENT , ON TELE NUMBER 29 THAT [id number] NSR , PIV INTACT INFUSING
WELL .

## 2017-11-05 VITALS — SYSTOLIC BLOOD PRESSURE: 134 MMHG | DIASTOLIC BLOOD PRESSURE: 75 MMHG

## 2017-11-05 VITALS — DIASTOLIC BLOOD PRESSURE: 85 MMHG | SYSTOLIC BLOOD PRESSURE: 156 MMHG

## 2017-11-05 VITALS — SYSTOLIC BLOOD PRESSURE: 124 MMHG | DIASTOLIC BLOOD PRESSURE: 73 MMHG

## 2017-11-05 VITALS — DIASTOLIC BLOOD PRESSURE: 85 MMHG | SYSTOLIC BLOOD PRESSURE: 133 MMHG

## 2017-11-05 VITALS — DIASTOLIC BLOOD PRESSURE: 73 MMHG | SYSTOLIC BLOOD PRESSURE: 146 MMHG

## 2017-11-05 LAB
AMPHETAMINES UR QL SCN: (no result)
BASOPHILS NFR BLD: 0 % (ref 0–2)
BUN SERPL-MCNC: 24 MG/DL (ref 7–18)
CALCIUM SERPL-MCNC: 8.4 MG/DL (ref 8.5–10.1)
CHLORIDE SERPL-SCNC: 106 MMOL/L (ref 98–107)
CO2 SERPL-SCNC: 26.4 MMOL/L (ref 21–32)
CREAT SERPL-MCNC: 0.9 MG/DL (ref 0.6–1)
ERYTHROCYTE [DISTWIDTH] IN BLOOD BY AUTOMATED COUNT: 18.4 % (ref 11.5–14.5)
GFR SERPLBLD BASED ON 1.73 SQ M-ARVRAT: (no result) ML/MIN
GLUCOSE SERPL-MCNC: 109 MG/DL (ref 74–106)
PLATELET # BLD: 357 X10^3MCL (ref 130–400)
POTASSIUM SERPL-SCNC: 4.3 MMOL/L (ref 3.5–5.1)
SODIUM SERPL-SCNC: 142 MMOL/L (ref 136–145)

## 2017-11-05 NOTE — NUR
NO CHANGES OF CONDITION NOTED, ALL DUE MEDS GIVEN NO REACTION NOTED, PIV
INTACT INFUSING WELL , TELE NSR.

## 2017-11-05 NOTE — NUR
AOX4. TELE #29, ST. LUNGS CLEAR BUT DIMINISHED ON NC @ 2L. DENIES SOB AT THIS
TIME. PULSES PALPABLE, NO EDEMA. BOWEL SOUNDS ACTIVE. RED DISCOLORATION NOTED
TO BUE AND BLE. NS @ 50 ML/HR TO R WRIST, NO REDNESS OR SWELLING. BED IN LOW
POSITION, CALL LIGHT IN REACH. INSTRUCTED TO CALL FOR ASSISTANCE.

## 2017-11-05 NOTE — NUR
RESTING IN BED. HOB ELEVETED. NO ACUTE RESP. DISTRESS NOTED. NO C/O PAIN OR
DISCOMFORT AT THIS TIME. VS WNL. IVF INFUSING WELL AND SITE CLEAR,.CALL LIGHT
WITHIN REACH. WILL BE ENDORSED TO INCOMING SHIFT.

## 2017-11-05 NOTE — NUR
RECEIVED SLEEPING BUT AROUSABLE. NO ACUTE RESP.DISTRESS NOTED. NO C/O PAIN OR
DISCOMFORT. IVF INFUSING WELL AND SITE CLEAR. CALL LIGHT WITHIN REACH. WILL
CONTINUE W/PLAN OF CARE.

## 2017-11-06 VITALS — DIASTOLIC BLOOD PRESSURE: 60 MMHG | SYSTOLIC BLOOD PRESSURE: 114 MMHG

## 2017-11-06 VITALS — DIASTOLIC BLOOD PRESSURE: 60 MMHG | SYSTOLIC BLOOD PRESSURE: 99 MMHG

## 2017-11-06 VITALS — DIASTOLIC BLOOD PRESSURE: 74 MMHG | SYSTOLIC BLOOD PRESSURE: 150 MMHG

## 2017-11-06 VITALS — SYSTOLIC BLOOD PRESSURE: 149 MMHG | DIASTOLIC BLOOD PRESSURE: 81 MMHG

## 2017-11-06 VITALS — DIASTOLIC BLOOD PRESSURE: 71 MMHG | SYSTOLIC BLOOD PRESSURE: 123 MMHG

## 2017-11-06 LAB
BASOPHILS NFR BLD: 0 % (ref 0–2)
BUN SERPL-MCNC: 31 MG/DL (ref 7–18)
CALCIUM SERPL-MCNC: 8.8 MG/DL (ref 8.5–10.1)
CHLORIDE SERPL-SCNC: 106 MMOL/L (ref 98–107)
CO2 SERPL-SCNC: 26.2 MMOL/L (ref 21–32)
CREAT SERPL-MCNC: 1.3 MG/DL (ref 0.6–1)
ERYTHROCYTE [DISTWIDTH] IN BLOOD BY AUTOMATED COUNT: 17.8 % (ref 11.5–14.5)
GFR SERPLBLD BASED ON 1.73 SQ M-ARVRAT: (no result) ML/MIN
GLUCOSE SERPL-MCNC: 156 MG/DL (ref 74–106)
PLATELET # BLD: 391 X10^3MCL (ref 130–400)
POTASSIUM SERPL-SCNC: 4.3 MMOL/L (ref 3.5–5.1)
SODIUM SERPL-SCNC: 142 MMOL/L (ref 136–145)

## 2017-11-06 NOTE — NUR
RESTING WITH EYES CLOSED. AWAKENS EASILY TO VERBAL STIMULI. NO ACUTE DISTRESS
 NOTED. WILL CONTINUE TO MONITOR.

## 2017-11-06 NOTE — NUR
SCHEDULED MEDICATIONS ADMINSTERED. NO SWALLOWING DIFFICULTY NOTED. ENCOURAGED
USE OF IS DEVICE. PATIENT VERBALIZES UNDERSTANDING.

## 2017-11-06 NOTE — NUR
PT STATED SHE HAS HEADACHE. TYLENOL PO GIVEN AS ORDERED. REASSESSED AFTER AN
HOUR PT STATED TYLENOL PO HELPED HER LITTILE AND HA IS TOLERABLE NOW. WILL
MONITOR.

## 2017-11-06 NOTE — NUR
PT STATED HER HEADACHE IS COMING BACK AND IT 5/10 AND ALSO NAUSEA IS COMING
BACK ALSO AND ZOFRAN DIDNOT HELPED ALL THE WAY. INFORMED  ABOUT THAT
HE SAID HE WILL ORDER PHENERGAN AND TORADOL. WILL GIVE ASAP AVAILABLE.

## 2017-11-06 NOTE — NUR
REASSESSED PT AFTER PHENERGAN PO AND TORADOL IV. PT STATED SHE FELT BETTER.
NO MORE HEADACHE AND NAUSEA.

## 2017-11-06 NOTE — NUR
RECEIVED PT IN BED. ASSESSED AND DOCUMENTED. DENIES PAIN THIS TIME. SAFTEY
PRECAUTIONS ARE IN PLACE. WILL MONITOR.

## 2017-11-07 VITALS — SYSTOLIC BLOOD PRESSURE: 136 MMHG | DIASTOLIC BLOOD PRESSURE: 69 MMHG

## 2017-11-07 VITALS — SYSTOLIC BLOOD PRESSURE: 143 MMHG | DIASTOLIC BLOOD PRESSURE: 79 MMHG

## 2017-11-07 VITALS — SYSTOLIC BLOOD PRESSURE: 144 MMHG | DIASTOLIC BLOOD PRESSURE: 89 MMHG

## 2017-11-07 VITALS — DIASTOLIC BLOOD PRESSURE: 75 MMHG | SYSTOLIC BLOOD PRESSURE: 145 MMHG

## 2017-11-07 VITALS — DIASTOLIC BLOOD PRESSURE: 79 MMHG | SYSTOLIC BLOOD PRESSURE: 129 MMHG

## 2017-11-07 LAB
BASOPHILS NFR BLD: 0 % (ref 0–2)
BUN SERPL-MCNC: 31 MG/DL (ref 7–18)
CALCIUM SERPL-MCNC: 8.4 MG/DL (ref 8.5–10.1)
CHLORIDE SERPL-SCNC: 107 MMOL/L (ref 98–107)
CO2 SERPL-SCNC: 30.9 MMOL/L (ref 21–32)
CREAT SERPL-MCNC: 1 MG/DL (ref 0.6–1)
ERYTHROCYTE [DISTWIDTH] IN BLOOD BY AUTOMATED COUNT: 18.4 % (ref 11.5–14.5)
GFR SERPLBLD BASED ON 1.73 SQ M-ARVRAT: (no result) ML/MIN
GLUCOSE SERPL-MCNC: 120 MG/DL (ref 74–106)
PLATELET # BLD: 343 X10^3MCL (ref 130–400)
POTASSIUM SERPL-SCNC: 4.7 MMOL/L (ref 3.5–5.1)
SODIUM SERPL-SCNC: 143 MMOL/L (ref 136–145)

## 2017-11-07 NOTE — NUR
PATIENT QUIETLY RESTING IN BED AT THIS TIME. NO S/SX OF RESPIRATORY DISTRESS
NOTED. CURRENTLY ON 2 LITERS NASAL CANNULA. CALL LIGHT WTIHIN REACH AND ALL
NEEDS MET AT THIS TIME.

## 2017-11-07 NOTE — NUR
DR PEDERSON AWARE OF PT'S BLOOD SUGARS OF 63, 59, AND 60 AT 1633 AND
THAT I GAVE HER AN AMP OF D 50 AT 1636.

## 2017-11-07 NOTE — NUR
PATIENT QUIETLY RESTING IN BED. C/O SLIGHT MID STERNUM CHEST PAIN. CALLED FOR
RT TO PROVIDE BREATHING TREATMENT AND GAVE MEDICAION. ASSISTED PATIENT TO BED
PAN. ONE BM NOTED. CLEANED PATIENT AND PATIENT DENIES CONTINUOUS PAIN TO
CHEST. CALL LIGHT WITHIN REACH AND INFORMED PATIENT TO INFORM IF SHE CONTINUES
TO HAVE ANY SOB OR CHEST PAIN.

## 2017-11-07 NOTE — NUR
AAO TIMES 4. TELE # 29 SR. VS'S STABLE. PT STATES HER H/A IS A 5/10 AT THIS
TIME AFTER THE IMITREX. IV SITE RW PATENT, CDI. COOPERATIVE AND PLEASANT. O2
2L NC. SCD BLE.

## 2017-11-07 NOTE — NUR
AAO TIMES 4. TELE # 29 SR. VS'S STABLE. NO SOB. O2 SAT ON RA 96%. BS'S ACTIVE
TIMES 4. LUE PROTECTED, WITH SPECIAL PINK WRISTBAND. PERIPHERAL PULSES
PALPABLE. NO EDEMA. SCD BLE. COOPERATIVE AND PLEASANT. NO SOB. PT STATES SHE
STILL HAS MILD DIZZINESS. SHE KNOWS TO CALL FOR HELP TO GET OOB.
 
MEDICAL ROUNDS PERFORMED WITH DR TALAMANTES AND THE MEDICINE TEAM AT 0812 THIS
AM. THE PLAN TODAY IS SHE WILL STAY ANOTHER 2 DAYS FOR IV ANTIBIOTICS. SHE IS
FINE WITH THE PLAN.

## 2017-11-07 NOTE — NUR
PATIENT COMPLAINED OF DIFFICULTY BREATHING. HEAD OF BED ELEVATED. MAINTAINED
ON O2 3L PER NASAL CANNULA. BREATHING TREATMENT GIVEN BY RESPIRATORY
THERAPIST.

## 2017-11-07 NOTE — NUR
PATIENT STILL COMPLAINING OF DIFFICULTY BREATHING, SHAKY, O2 SAT 96%.
MAINTAINED ON O2 3L PER NASAL CANNULA AND HOB ELEVATED. DR BOSCH MADE AWARE.

## 2017-11-07 NOTE — NUR
DR PEDERSON ORDERED IMETREX FOR PTS' PERSISTANT NAUSEA AND HEADACHE. IMETREX 50
MG PO WAS GIVEN AT 1629.

## 2017-11-07 NOTE — NUR
PATIENT RESTING IN BED. RESPIRATION EVEN AND UNLABORED, GETS SOB EASILY,
ON O2 2L PER NASAL CANNULA, ON RT PROTOCOL. ONGOING D5NS AT 80 CC/HR INFUSING
WELL. TRACE EDEMA TO BLE. VOIDING FREELY, USES BEDSIDE COMMODE. GENERALIZED
WEAKNESS. DISCOLORATION/ECCHYMOSIS TO BUE/BLE. ON TELE #29. WILL CONTINUE TO
MONITOR.

## 2017-11-07 NOTE — NUR
***PT NOTES***
TIME 1320 PVE (2)
CHART REVIEWED AND CLEARED FOR PT BY NURSING, BENY. RECEIVED
PT IN BED WITH 2PA, BUT PT REFUSED ALL SKILLED INTERVENTIONS. PT STATES,
" I FEEL SICK, AND I HAVE STOMACH PAIN. ALL I WANT TO DO RIGHT NOW IS REST AND
SLEEP. I'M SORRY, BUT I WILL TRY TOMORROW." EDUCATED PT THE
IMPORTANCE/BENEFITS OF PERFORMING PHYSICAL THERAPY WITH PT HAVING A GOOD
UNDERSTANDING. DISCUSSED WITH NURSING. BED ALARM ON AND 3X BED RAILS UP.

## 2017-11-07 NOTE — NUR
AT 1633, PT'S ACCUCHECK WAS 63, THEN RECHECKED FOR 59, THEN RECHECKED AND IT
WAS 60. I AMP OF D50 WAS GIVEN AT 1636. PT IS AAO TIMES 4, BUT SAYS SHE FEELS
A LITTLE WEIRD.

## 2017-11-07 NOTE — NUR
PT C/O NAUSEA. GAVE PHENERGAN 12.5 MG IV DILUTED IN 9 CC NS GIVEN AT 1255. AT
1455 PT STATES IT ONLY HELPED A LITTLE. HER HEADACHE IS STILL PRESENT, I AM
PAGED AND NOTIFIED DR PEDERSON.

## 2017-11-08 VITALS — SYSTOLIC BLOOD PRESSURE: 126 MMHG | DIASTOLIC BLOOD PRESSURE: 57 MMHG

## 2017-11-08 VITALS — DIASTOLIC BLOOD PRESSURE: 63 MMHG | SYSTOLIC BLOOD PRESSURE: 120 MMHG

## 2017-11-08 VITALS — DIASTOLIC BLOOD PRESSURE: 79 MMHG | SYSTOLIC BLOOD PRESSURE: 152 MMHG

## 2017-11-08 VITALS — DIASTOLIC BLOOD PRESSURE: 74 MMHG | SYSTOLIC BLOOD PRESSURE: 144 MMHG

## 2017-11-08 VITALS — SYSTOLIC BLOOD PRESSURE: 144 MMHG | DIASTOLIC BLOOD PRESSURE: 81 MMHG

## 2017-11-08 LAB
BASOPHILS NFR BLD: 0.3 % (ref 0–2)
BUN SERPL-MCNC: 21 MG/DL (ref 7–18)
CALCIUM SERPL-MCNC: 8.3 MG/DL (ref 8.5–10.1)
CHLORIDE SERPL-SCNC: 107 MMOL/L (ref 98–107)
CO2 SERPL-SCNC: 30 MMOL/L (ref 21–32)
CREAT SERPL-MCNC: 1.1 MG/DL (ref 0.6–1)
ERYTHROCYTE [DISTWIDTH] IN BLOOD BY AUTOMATED COUNT: 18.2 % (ref 11.5–14.5)
GFR SERPLBLD BASED ON 1.73 SQ M-ARVRAT: (no result) ML/MIN
GLUCOSE SERPL-MCNC: 97 MG/DL (ref 74–106)
PLATELET # BLD: 339 X10^3MCL (ref 130–400)
POTASSIUM SERPL-SCNC: 3.7 MMOL/L (ref 3.5–5.1)
SODIUM SERPL-SCNC: 142 MMOL/L (ref 136–145)

## 2017-11-08 NOTE — NUR
PATIENT RESTING IN BED. RESPIRATION EVEN AND UNLABORED, ON CPAP OVERNIGHT
SHIFTED TO NASAL CANNULA AT 3L. IV SITE NO SIGN OF INFILTRATION. ASSISTED WITH
NEEDS. SAFETY OBSERVED. PLACED CALL LIGHT WITHIN REACH AT ALL TIMES.

## 2017-11-08 NOTE — NUR
PATIENT RECEIVED RESTING IN BED. RESPIRATION EVEN AND UNLABORED, ON O2 2L PER
NASAL CANNULA, ON RT PROTOCOL. ONGOING D5NS AT 20 ML/HR INFUSING WELL AT THE
RIGHT FOREARM. TRACE EDEMA TO BLE. VOIDING FREELY, USES BEDSIDE COMMODE WITH
ASSIST. GENERALIZED WEAKNESS. DISCOLORATION TO BUE/BLE. ON TELE #29. WILL
CONTINUE TO MONITOR.

## 2017-11-08 NOTE — NUR
Pt. AAOX4 RESPIRATIONS EVEN AND UNLABORED AT THIS TIME. O2 2L/MIN NC SOB ON
EXCERTION. ON RT PROTOCOL. NO DISTRESS NOTED. DENIES PAIN/DISCOMFORT AT THIS
TIME. DENIES N/V/D. ASSIST TO BSC AS NEEDED. TELE IN PLACE. IVF RUNNING TO IV
RFA PATENT AND INTACT. BED LOW/LOCKED. CALL LIGHT IN REACH.

## 2017-11-08 NOTE — NUR
Pt. RELIEF POST PHENERGAN ADMINISTRATION, STILL C/O HEADACHE 6/10 SCALE, NORCO
ADMINISTERED PER EMAR.

## 2017-11-08 NOTE — NUR
Pt. C/O PAIN AT IV SITE TO RIGHT INDEX FINGER REMOVED WITH CATH INTACT. IV
RIGHT WRIST PLUGGED REMOVED WITH CATH INTACT. NEW IV LINE STARTED TO RFA 24 G
WITH GOOD BLOOD RETURN AND Pt. TOLERATED WELL. RESUMED IVF PER EMAR.

## 2017-11-08 NOTE — NUR
MADE ROUNDS WITH DR. TALAMANTES AND MEDICINE TEAM, Pt. TO HAVE SNF PLACEMENT
ARRANGED AND AGREED WITH PLAN OF CARE.

## 2017-11-08 NOTE — NUR
RECEIVED Pt. AAOX4, O2 4L/MIN NC SOB DURING ACTIVITY, RESPIRATIONS EVEN AND
UNLABORED AT THIS TIME. TELE IN PLACE DENIES CHEST PAIN/PRESSURE. IVF RUNNING
TO IV RIGHT FINGER PATENT AND INTACT. BED LOW/LOCKED. CALL LIGHT IN REACH. Pt.
INSTRUCTED TO USE I.S.

## 2017-11-08 NOTE — NUR
Pt. STILL C/O NAUSEA PHENERGAN ADMINISTERED PER EMAR, Pt. ALSO C/O HEADACHE
8/10 SCALE, Pt. REQUESTING FOR PAIN MEDICATION ADMINISTERED THRU IV DUE TO
NAUSEA. TORADOL ADMINISTERED IVP WILL CONTINUE TO MONITOR.

## 2017-11-09 VITALS — DIASTOLIC BLOOD PRESSURE: 78 MMHG | SYSTOLIC BLOOD PRESSURE: 142 MMHG

## 2017-11-09 VITALS — SYSTOLIC BLOOD PRESSURE: 152 MMHG | DIASTOLIC BLOOD PRESSURE: 80 MMHG

## 2017-11-09 VITALS — DIASTOLIC BLOOD PRESSURE: 90 MMHG | SYSTOLIC BLOOD PRESSURE: 160 MMHG

## 2017-11-09 VITALS — SYSTOLIC BLOOD PRESSURE: 160 MMHG | DIASTOLIC BLOOD PRESSURE: 90 MMHG

## 2017-11-09 VITALS — SYSTOLIC BLOOD PRESSURE: 143 MMHG | DIASTOLIC BLOOD PRESSURE: 69 MMHG

## 2017-11-09 LAB
BASOPHILS NFR BLD: 0.7 % (ref 0–2)
ERYTHROCYTE [DISTWIDTH] IN BLOOD BY AUTOMATED COUNT: 18.5 % (ref 11.5–14.5)
PLATELET # BLD: 345 X10^3MCL (ref 130–400)

## 2017-11-09 NOTE — NUR
PT APPEARS TO BE ASLEEP, NO SIGNS OF PAIN OR DISCOMFORT. NO SOB ON O2 VIA NC
AT 2L. CALL LIGHT WITHIN REACH.

## 2017-11-09 NOTE — NUR
**********PHYSICAL THERAPY DAILY NOTES CO-SIGN**********
All documentation done by the Physical Therapy Assistant for 11/09/17
has been reviewed. I agree with the documentation.
 
Reviewed/Co-Signed by: Lena Kiran, BELIA
Documentation Done by: Gomez Quintana PTA
Patient chrissy tx fairly, unable to progress gait d/t dizziness and unsteadiness
with increased tremors. Cont with PT POC as chrissy/safe.

## 2017-11-09 NOTE — NUR
RECEIVED PT IN BED ALERT AND ORIENTED X4. NSR ON TELE MONITOR. LUNG SOUNDS
DIMINISHED. SOB NOTED WITH EXERTION. HOB ELEVATED.  DENIES ANY GI UPSET. TRACE
EDEMA NOTED TO BUE AND BLE. VOIDS FREELY. UP TO BSC WITH ASSISTANCE. SKIN
INTACT. INSTRUCTED TO USE CALL LIGHT WHEN IN NEED OF ANY ASSISTANCE.

## 2017-11-09 NOTE — NUR
[facility] HERE TO  PT FOR TRANSFER TO [facility]. REPORT
GIVEN TO CORTNEY PAGE. PT BROUGHT OFF FLOOR VIA C WITH O2 VIA NC AT 2L.

## 2017-11-09 NOTE — NUR
PATIENT RESTING IN BED. RESPIRATION EVEN AND UNLABORED, ON CPAP OVERNIGHT
SHIFTED TO NASAL CANNULA AT 2L. IV SITE NO SIGN OF INFILTRATION. ASSISTED WITH
NEEDS. SAFETY OBSERVED. PLACED CALL LIGHT WITHIN REACH AT ALL TIMES.

## 2018-12-12 NOTE — NUR
CHEST X RAYS DONE [facility]  [address] [facility] [address]  [phone]        December 18, 2019    Emerald Pearl  [address]  [facility] [address]              Dear Emerald Pearl    This is to remind you that your fasting lab is due.    You may call our office at [phone] to schedule an appointment.    Please disregard this notice if you have already had your labs drawn or made an appointment.        Sincerely,        Lily Fine PA-C

## 2019-08-09 ENCOUNTER — HOSPITAL ENCOUNTER (EMERGENCY)
Dept: HOSPITAL 1 - ED | Age: 79
Discharge: HOME | End: 2019-08-09
Payer: COMMERCIAL

## 2019-08-09 VITALS
BODY MASS INDEX: 30.36 KG/M2 | BODY MASS INDEX: 30.36 KG/M2 | HEIGHT: 69 IN | HEIGHT: 69 IN | WEIGHT: 205 LBS | WEIGHT: 205 LBS

## 2019-08-09 VITALS — SYSTOLIC BLOOD PRESSURE: 144 MMHG | DIASTOLIC BLOOD PRESSURE: 86 MMHG

## 2019-08-09 DIAGNOSIS — J44.9: ICD-10-CM
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
## 2019-08-09 DIAGNOSIS — F32.9: ICD-10-CM

## 2019-08-09 DIAGNOSIS — E78.00: ICD-10-CM
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
## 2019-08-09 DIAGNOSIS — S81.812A: Primary | ICD-10-CM
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
## 2019-08-09 DIAGNOSIS — E03.9: ICD-10-CM
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
## 2019-08-09 DIAGNOSIS — I10: ICD-10-CM

## 2019-08-11 ENCOUNTER — HOSPITAL ENCOUNTER (EMERGENCY)
Dept: HOSPITAL 1 - ED | Age: 79
Discharge: HOME | End: 2019-08-11
Payer: COMMERCIAL

## 2019-08-11 VITALS
BODY MASS INDEX: 30.36 KG/M2 | BODY MASS INDEX: 30.36 KG/M2 | WEIGHT: 205 LBS | HEIGHT: 69 IN | HEIGHT: 69 IN | WEIGHT: 205 LBS

## 2019-08-11 VITALS — DIASTOLIC BLOOD PRESSURE: 75 MMHG | SYSTOLIC BLOOD PRESSURE: 122 MMHG

## 2019-08-11 DIAGNOSIS — F32.9: ICD-10-CM
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
## 2019-08-11 DIAGNOSIS — S81.811D: Primary | ICD-10-CM

## 2019-08-11 DIAGNOSIS — E78.00: ICD-10-CM

## 2019-08-11 DIAGNOSIS — Z88.5: ICD-10-CM

## 2019-08-11 DIAGNOSIS — J44.9: ICD-10-CM

## 2019-08-11 DIAGNOSIS — E03.9: ICD-10-CM

## 2019-08-11 DIAGNOSIS — I10: ICD-10-CM

## 2019-08-21 ENCOUNTER — HOSPITAL ENCOUNTER (EMERGENCY)
Dept: HOSPITAL 1 - ED | Age: 79
Discharge: HOME | End: 2019-08-21
Payer: COMMERCIAL

## 2019-08-21 VITALS — HEIGHT: 61 IN | BODY MASS INDEX: 33.99 KG/M2 | WEIGHT: 180 LBS

## 2019-08-21 VITALS — SYSTOLIC BLOOD PRESSURE: 157 MMHG | DIASTOLIC BLOOD PRESSURE: 84 MMHG
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
## 2019-08-21 DIAGNOSIS — G56.00: ICD-10-CM

## 2019-08-21 DIAGNOSIS — E03.9: ICD-10-CM
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
## 2019-08-21 DIAGNOSIS — S81.812A: Primary | ICD-10-CM
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
## 2019-08-21 DIAGNOSIS — F32.9: ICD-10-CM

## 2019-08-21 DIAGNOSIS — I10: ICD-10-CM
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
## 2019-08-21 DIAGNOSIS — E78.00: ICD-10-CM

## 2019-08-21 DIAGNOSIS — J44.9: ICD-10-CM
